# Patient Record
Sex: MALE | Race: WHITE | NOT HISPANIC OR LATINO | URBAN - NONMETROPOLITAN AREA
[De-identification: names, ages, dates, MRNs, and addresses within clinical notes are randomized per-mention and may not be internally consistent; named-entity substitution may affect disease eponyms.]

---
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
## 2024-12-05 RX ORDER — INSULIN ASPART 100 [IU]/ML
10 INJECTION, SOLUTION INTRAVENOUS; SUBCUTANEOUS
COMMUNITY

## 2024-12-05 RX ORDER — CETIRIZINE HYDROCHLORIDE 10 MG/1
10 TABLET ORAL DAILY PRN
COMMUNITY

## 2024-12-05 RX ORDER — INSULIN GLARGINE 100 [IU]/ML
32 INJECTION, SOLUTION SUBCUTANEOUS
COMMUNITY

## 2024-12-05 RX ORDER — MOMETASONE FUROATE MONOHYDRATE 50 UG/1
2 SPRAY, METERED NASAL DAILY
COMMUNITY

## 2024-12-05 RX ORDER — INSULIN ASPART 100 [IU]/ML
INJECTION, SOLUTION INTRAVENOUS; SUBCUTANEOUS
COMMUNITY

## 2024-12-05 NOTE — PRE-PROCEDURE INSTRUCTIONS
Pre-Surgery Instructions:   Medication Instructions    cetirizine (ZyrTEC) 10 mg tablet Uses PRN- OK to take day of surgery    insulin aspart (NovoLOG FlexPen) 100 UNIT/ML injection pen Via pump-set at basal rate (if functioning properly)    insulin aspart (NovoLOG FlexPen) 100 UNIT/ML injection pen Hold day of surgery.    Lantus SoloStar 100 units/mL SOPN Take night before surgery    mometasone (NASONEX) 50 mcg/act nasal spray Take day of surgery.    Multiple Vitamins-Minerals (Mens Multivitamin) TABS Stop taking 7 days prior to surgery.   Medication instructions for day surgery reviewed. Please use only a sip of water to take your instructed medications. Avoid all over the counter vitamins, supplements and NSAIDS for one week prior to surgery per anesthesia guidelines. Tylenol is ok to take as needed.     You will receive a call one business day prior to surgery with an arrival time and hospital directions. If your surgery is scheduled on a Monday, the hospital will be calling you on the Friday prior to your surgery. If you have not heard from anyone by 8pm, please call the hospital supervisor through the hospital  at [phone]. ([place] [phone] or [place] [phone]).    Do not eat or drink anything after midnight the night before your surgery, including candy, mints, lifesavers, or chewing gum. Do not drink alcohol 24hrs before your surgery. Try not to smoke at least 24hrs before your surgery.       Follow the pre surgery showering instructions as listed in the “My Surgical Experience Booklet” or otherwise provided by your surgeon's office. Do not use a blade to shave the surgical area 1 week before surgery. It is okay to use a clean electric clippers up to 24 hours before surgery. Do not apply any lotions, creams, including makeup, cologne, deodorant, or perfumes after showering on the day of your surgery. Do not use dry shampoo, hair spray, hair gel, or any type of hair products.     No  contact lenses, eye make-up, or artificial eyelashes. Remove nail polish, including gel polish, and any artificial, gel, or acrylic nails if possible. Remove all jewelry including rings and body piercing jewelry.     Wear causal clothing that is easy to take on and off. Consider your type of surgery.    Keep any valuables, jewelry, piercings at home. Please bring any specially ordered equipment (sling, braces) if indicated.    Arrange for a responsible person to drive you to and from the hospital on the day of your surgery. Please confirm the visitor policy for the day of your procedure when you receive your phone call with an arrival time.     Call the surgeon's office with any new illnesses, exposures, or additional questions prior to surgery.    Please reference your “My Surgical Experience Booklet” for additional information to prepare for your upcoming surgery.

## 2024-12-05 NOTE — PRE-PROCEDURE INSTRUCTIONS
Pre-Surgery Instructions:   Medication Instructions    cetirizine (ZyrTEC) 10 mg tablet Uses PRN- OK to take day of surgery    Lantus SoloStar 100 units/mL SOPN Uses PRN- OK to take day of surgery    mometasone (NASONEX) 50 mcg/act nasal spray Take day of surgery.    Multiple Vitamins-Minerals (Mens Multivitamin) TABS Stop taking 7 days prior to surgery.   Medication instructions for day surgery reviewed. Please use only a sip of water to take your instructed medications. Avoid all over the counter vitamins, supplements and NSAIDS for one week prior to surgery per anesthesia guidelines. Tylenol is ok to take as needed.     You will receive a call one business day prior to surgery with an arrival time and hospital directions. If your surgery is scheduled on a Monday, the hospital will be calling you on the Friday prior to your surgery. If you have not heard from anyone by 8pm, please call the hospital supervisor through the hospital  at [phone]. ([place] [phone] or [place] [phone]).    Do not eat or drink anything after midnight the night before your surgery, including candy, mints, lifesavers, or chewing gum. Do not drink alcohol 24hrs before your surgery. Try not to smoke at least 24hrs before your surgery.       Follow the pre surgery showering instructions as listed in the “My Surgical Experience Booklet” or otherwise provided by your surgeon's office. Do not use a blade to shave the surgical area 1 week before surgery. It is okay to use a clean electric clippers up to 24 hours before surgery. Do not apply any lotions, creams, including makeup, cologne, deodorant, or perfumes after showering on the day of your surgery. Do not use dry shampoo, hair spray, hair gel, or any type of hair products.     No contact lenses, eye make-up, or artificial eyelashes. Remove nail polish, including gel polish, and any artificial, gel, or acrylic nails if possible. Remove all jewelry including rings and  body piercing jewelry.     Wear causal clothing that is easy to take on and off. Consider your type of surgery.    Keep any valuables, jewelry, piercings at home. Please bring any specially ordered equipment (sling, braces) if indicated.    Arrange for a responsible person to drive you to and from the hospital on the day of your surgery. Please confirm the visitor policy for the day of your procedure when you receive your phone call with an arrival time.     Call the surgeon's office with any new illnesses, exposures, or additional questions prior to surgery.    Please reference your “My Surgical Experience Booklet” for additional information to prepare for your upcoming surgery.

## 2024-12-11 ENCOUNTER — ANESTHESIA EVENT (OUTPATIENT)
Dept: PERIOP | Facility: HOSPITAL | Age: 24
End: 2024-12-11
Payer: COMMERCIAL

## 2024-12-11 NOTE — ANESTHESIA PREPROCEDURE EVALUATION
Procedure:  MYRINGOTOMY WITH T-TUBES AND POSSIBLE PAPER PATCH TYMPANOPLASTIES (Bilateral: Ear)    Relevant Problems   No relevant active problems      IDDM    Marijuana  Physical Exam    Airway    Mallampati score: II  TM Distance: >3 FB  Neck ROM: full     Dental   No notable dental hx     Cardiovascular  Cardiovascular exam normal    Pulmonary  Pulmonary exam normal     Other Findings        Anesthesia Plan  ASA Score- 3     Anesthesia Type- IV sedation with anesthesia with ASA Monitors.         Additional Monitors:     Airway Plan:            Plan Factors-Exercise tolerance (METS): >4 METS.    Chart reviewed.  Imaging results reviewed. Existing labs reviewed. Patient summary reviewed.    Patient is a current smoker.              Induction- intravenous.    Postoperative Plan- Plan for postoperative opioid use.     Perioperative Resuscitation Plan - Level 1 - Full Code.       Informed Consent- Anesthetic plan and risks discussed with patient.  I personally reviewed this patient with the CRNA. Discussed and agreed on the Anesthesia Plan with the CRNA..

## 2024-12-12 ENCOUNTER — HOSPITAL ENCOUNTER (OUTPATIENT)
Facility: HOSPITAL | Age: 24
Setting detail: OUTPATIENT SURGERY
Discharge: HOME | End: 2024-12-12
Attending: OTOLARYNGOLOGY | Admitting: OTOLARYNGOLOGY
Payer: COMMERCIAL

## 2024-12-12 ENCOUNTER — ANESTHESIA (OUTPATIENT)
Dept: PERIOP | Facility: HOSPITAL | Age: 24
End: 2024-12-12
Payer: COMMERCIAL

## 2024-12-12 VITALS
RESPIRATION RATE: 18 BRPM | WEIGHT: 185 LBS | HEART RATE: 90 BPM | TEMPERATURE: 97.5 F | BODY MASS INDEX: 28.04 KG/M2 | HEIGHT: 68 IN | DIASTOLIC BLOOD PRESSURE: 82 MMHG | SYSTOLIC BLOOD PRESSURE: 127 MMHG | OXYGEN SATURATION: 97 %

## 2024-12-12 DIAGNOSIS — H66.93 OTITIS OF BOTH EARS: Primary | ICD-10-CM

## 2024-12-12 LAB
GLUCOSE SERPL-MCNC: 144 MG/DL (ref 65–140)
GLUCOSE SERPL-MCNC: 166 MG/DL (ref 65–140)

## 2024-12-12 PROCEDURE — 82948 REAGENT STRIP/BLOOD GLUCOSE: CPT

## 2024-12-12 DEVICE — RICHARDS MODIFIED T-TUBE 1.32 MM ID 4.8 MM LENGTH ULTRASIL SILICONE
Type: IMPLANTABLE DEVICE | Site: TYMPANIC MEMBRANE | Status: FUNCTIONAL
Brand: GYRUS ACMI

## 2024-12-12 RX ORDER — FENTANYL CITRATE/PF 50 MCG/ML
50 SYRINGE (ML) INJECTION
Status: DISCONTINUED | OUTPATIENT
Start: 2024-12-12 | End: 2024-12-12 | Stop reason: HOSPADM

## 2024-12-12 RX ORDER — DOXYCYCLINE 100 MG/1
100 CAPSULE ORAL DAILY
COMMUNITY

## 2024-12-12 RX ORDER — ACETAMINOPHEN 325 MG/1
975 TABLET ORAL ONCE
Status: COMPLETED | OUTPATIENT
Start: 2024-12-12 | End: 2024-12-12

## 2024-12-12 RX ORDER — MIDAZOLAM HYDROCHLORIDE 2 MG/2ML
INJECTION, SOLUTION INTRAMUSCULAR; INTRAVENOUS AS NEEDED
Status: DISCONTINUED | OUTPATIENT
Start: 2024-12-12 | End: 2024-12-12

## 2024-12-12 RX ORDER — OFLOXACIN 3 MG/ML
5 SOLUTION AURICULAR (OTIC) 2 TIMES DAILY
Qty: 10 ML | Refills: 0 | Status: SHIPPED | OUTPATIENT
Start: 2024-12-12

## 2024-12-12 RX ORDER — PROPOFOL 10 MG/ML
INJECTION, EMULSION INTRAVENOUS CONTINUOUS PRN
Status: DISCONTINUED | OUTPATIENT
Start: 2024-12-12 | End: 2024-12-12

## 2024-12-12 RX ORDER — OXYMETAZOLINE HYDROCHLORIDE 0.05 G/100ML
SPRAY NASAL AS NEEDED
Status: DISCONTINUED | OUTPATIENT
Start: 2024-12-12 | End: 2024-12-12 | Stop reason: HOSPADM

## 2024-12-12 RX ORDER — FENTANYL CITRATE 50 UG/ML
INJECTION, SOLUTION INTRAMUSCULAR; INTRAVENOUS AS NEEDED
Status: DISCONTINUED | OUTPATIENT
Start: 2024-12-12 | End: 2024-12-12

## 2024-12-12 RX ORDER — SODIUM CHLORIDE, SODIUM LACTATE, POTASSIUM CHLORIDE, CALCIUM CHLORIDE 600; 310; 30; 20 MG/100ML; MG/100ML; MG/100ML; MG/100ML
INJECTION, SOLUTION INTRAVENOUS CONTINUOUS PRN
Status: DISCONTINUED | OUTPATIENT
Start: 2024-12-12 | End: 2024-12-12

## 2024-12-12 RX ORDER — PROPOFOL 10 MG/ML
INJECTION, EMULSION INTRAVENOUS AS NEEDED
Status: DISCONTINUED | OUTPATIENT
Start: 2024-12-12 | End: 2024-12-12

## 2024-12-12 RX ADMIN — ACETAMINOPHEN 325MG 975 MG: 325 TABLET ORAL at 09:43

## 2024-12-12 RX ADMIN — PROPOFOL 100 MCG/KG/MIN: 10 INJECTION, EMULSION INTRAVENOUS at 10:52

## 2024-12-12 RX ADMIN — SODIUM CHLORIDE 8 MCG: 9 INJECTION, SOLUTION INTRAVENOUS at 10:53

## 2024-12-12 RX ADMIN — MIDAZOLAM HYDROCHLORIDE 2 MG: 1 INJECTION, SOLUTION INTRAMUSCULAR; INTRAVENOUS at 10:50

## 2024-12-12 RX ADMIN — SODIUM CHLORIDE, SODIUM LACTATE, POTASSIUM CHLORIDE, AND CALCIUM CHLORIDE: .6; .31; .03; .02 INJECTION, SOLUTION INTRAVENOUS at 10:50

## 2024-12-12 RX ADMIN — SODIUM CHLORIDE 4 MCG: 9 INJECTION, SOLUTION INTRAVENOUS at 10:55

## 2024-12-12 RX ADMIN — FENTANYL CITRATE 25 MCG: 50 INJECTION, SOLUTION INTRAMUSCULAR; INTRAVENOUS at 10:52

## 2024-12-12 RX ADMIN — PROPOFOL 50 MG: 10 INJECTION, EMULSION INTRAVENOUS at 10:52

## 2024-12-12 NOTE — INTERVAL H&P NOTE
H&P reviewed. After examining the patient I find no changes in the patients condition since the H&P had been written.    Vitals:    12/12/24 0935   BP: 127/78   Pulse: 84   Resp: 20   Temp: 97.6 °F (36.4 °C)   SpO2: 98%

## 2024-12-12 NOTE — OP NOTE
OPERATIVE REPORT  PATIENT NAME: Joseph W Hosgood    :  2000  MRN: [id number]  Pt Location: [facility] OR ROOM 01    SURGERY DATE: 2024    Surgeons and Role:     * Ubaldo Leonardo MD - Primary    Preop Diagnosis:  Unspecified eustachian tube disorder, bilateral [H69.93]    Post-Op Diagnosis Codes:     * Unspecified eustachian tube disorder, bilateral [H69.93]    Procedure(s):  Bilateral - MYRINGOTOMY WITH T-TUBES    Specimen(s):  * No specimens in log *    Estimated Blood Loss:   Minimal    Drains:  * No LDAs found *    Anesthesia Type:   General    Operative Indications:  Unspecified eustachian tube disorder, bilateral [H69.93]      Operative Findings:  Serous otitis      Complications:   None    Procedure and Technique:  The patient was identified and taken to the operative suite.  A timeout was called.  After the successful induction of IV sedation, the patient was prepped and draped in usual fashion.      A 4-0 speculum was inserted into the right external auditory canal and microscope was placed into position.  Under microscopic visualization, cerumen was debrided with a cerumen curette.  Using microscopic visualization, an anterior, inferior radial incision was made in the tympanic membrane and a serous effusion was suctioned with a #5 suction.  The myringotomy tube was placed.  The exact same findings and procedure were performed on the left ear as described on the right.      The patient was taken to the PACU in excellent condition.  Instrument and sponge counts were correct x 2 at the end of the case.     I was present for the entire procedure.    Patient Disposition:  PACU              SIGNATURE: Ubaldo Leonardo MD  DATE: 2024  TIME: 10:58 AM

## 2024-12-12 NOTE — DISCHARGE SUMMARY
Discharge Summary - ENT   Name: Joseph W Hosgood 24 y.o. male I MRN: [id number]  Unit/Bed#: OR POOL I Date of Admission: 12/12/2024   Date of Service: 12/12/2024 I Hospital Day: 0    Admission Date: 12/12/2024 0913  Discharge Date: 12/12/24  Admitting Diagnosis: Unspecified eustachian tube disorder, bilateral [H69.93]  Discharge Diagnosis:   Medical Problems       Resolved Problems  Date Reviewed: 12/12/2024   None         HPI: Status post BMT    Procedures Performed: No orders of the defined types were placed in this encounter.      Summary of Hospital Course:  unremarkable    Significant Findings, Care, Treatment and Services Provided: Surgery    Complications: None    Condition at Discharge: good       Discharge instructions/Information to patient and family:   See After Visit Summary (AVS) for information provided to patient and family.      Provisions for Follow-Up Care:  See after visit summary for information related to follow-up care and any pertinent home health orders.      PCP: No primary care provider on file.    Disposition: Home    Planned Readmission: No     Discharge Medications:  See after visit summary for reconciled discharge medications provided to patient and family.      Discharge Statement:  I have spent a total time of 15 minutes in caring for this patient on the day of the visit/encounter. .

## 2024-12-12 NOTE — DISCHARGE INSTR - AVS FIRST PAGE
[facility] [address] [facility]  [address], [address]   [place] [address]   PHONE: [phone]     BRITTA LEONARDO M.D.       DR. LEONARDO'S POST OP INSTRUCTIONS FOR MYRINGOTOMY TUBES  Ear drops are occasionally provided for your use.  Current best practice recommendations do not require drops to be used and if there is not an active infection at the time of surgery, drops will not be given.  Floxin 5 drops twice a day for three days. Floxin Otic drops (sometimes eye drops) are used in the ear.  Save the drops for future use:  If you think water accidentally got into the ears, use two drops to the affected ear one time.  If infection or bloody drainage from the ear, (usually during a cold), use three drops in the affected ear three times a day.  If not better in three days, or if there is worsening, call our office.  Use Tylenol for any pain.  Swimplugs can be purchased over-the-counter.  In general, Dry ear precautions are not necessary for any clean water source such as a pool or shower/bath.  If swimming in a lake or pond, earplugs should be worn.  As always, feel free to call us if you have any questions.

## 2024-12-12 NOTE — ANESTHESIA POSTPROCEDURE EVALUATION
Post-Op Assessment Note    CV Status:  Stable  Pain Score: 0    Pain management: adequate       Mental Status:  Alert and awake   Hydration Status:  Euvolemic   PONV Controlled:  Controlled   Airway Patency:  Patent     Post Op Vitals Reviewed: Yes    No anethesia notable event occurred.    Staff: CRNA           Last Filed PACU Vitals:  Vitals Value Taken Time   Temp 97.6    Pulse 62    /59 12/12/24 1104   Resp 12    SpO2 100    Vitals shown include unfiled device data.    Modified Gigi:  No data recorded

## 2024-12-12 NOTE — ANESTHESIA POSTPROCEDURE EVALUATION
Post-Op Assessment Note    Last Filed PACU Vitals:  Vitals Value Taken Time   Temp 97.5 °F (36.4 °C) 12/12/24 1150   Pulse 85 12/12/24 1150   /86 12/12/24 1150   Resp 18 12/12/24 1150   SpO2 98 % 12/12/24 1150       Modified Gigi:  Activity: 2 (12/12/2024 12:06 PM)  Respiration: 2 (12/12/2024 12:06 PM)  Circulation: 2 (12/12/2024 12:06 PM)  Consciousness: 2 (12/12/2024 12:06 PM)  Oxygen Saturation: 2 (12/12/2024 12:06 PM)  Modified Gigi Score: 10 (12/12/2024 12:06 PM)

## (undated) DEVICE — BLADE MYRINGOTOMY 377121

## (undated) DEVICE — SKIN MARKER DUAL TIP WITH RULER CAP, FLEXIBLE RULER AND LABELS: Brand: DEVON

## (undated) DEVICE — TUBING SUCTION 5MM X 12 FT

## (undated) DEVICE — GLOVE INDICATOR PI UNDERGLOVE SZ 8 BLUE

## (undated) DEVICE — MAYO STAND COVER: Brand: CONVERTORS

## (undated) DEVICE — GAUZE SPONGES,USP TYPE VII GAUZE, 12 PLY: Brand: CURITY

## (undated) DEVICE — SYRINGE 3ML LL

## (undated) DEVICE — SINGLE PORT MANIFOLD: Brand: NEPTUNE 2

## (undated) DEVICE — DISPOSABLE OR TOWEL: Brand: CARDINAL HEALTH

## (undated) DEVICE — SOLUTION BOWL: Brand: KENDALL